# Patient Record
Sex: MALE | Race: WHITE
[De-identification: names, ages, dates, MRNs, and addresses within clinical notes are randomized per-mention and may not be internally consistent; named-entity substitution may affect disease eponyms.]

---

## 2018-07-03 NOTE — DIAGNOSTIC IMAGING REPORT
PROCEDURE: US abdomen complete.



TECHNIQUE: Multiple real-time grayscale images were obtained over

the abdomen in various projections.



INDICATION: Epigastric pain.



FINDINGS: The liver is normal in size at 15.6 cm. No discrete

liver mass is identified. The portal vein is patent and

demonstrates normal direction of flow. Gallbladder contains small

polyps. No stones or sludge are identified. There is no wall

thickening or biliary ductal dilatation. Pancreas is poorly

visualized due to bowel gas. Spleen is normal in size. Aorta and

IVC are unremarkable. The right and left kidneys are

unremarkable. There is no ascites.



IMPRESSION: Essentially unremarkable abdominal ultrasound. No

acute feature is detected.



Dictated by: 



  Dictated on workstation # [id number]

## 2021-06-05 ENCOUNTER — HOSPITAL ENCOUNTER (INPATIENT)
Dept: HOSPITAL 75 - ER | Age: 51
LOS: 1 days | Discharge: HOME | DRG: 310 | End: 2021-06-06
Attending: FAMILY MEDICINE | Admitting: FAMILY MEDICINE
Payer: COMMERCIAL

## 2021-06-05 VITALS — HEIGHT: 68.9 IN | WEIGHT: 209.88 LBS | BODY MASS INDEX: 31.09 KG/M2

## 2021-06-05 VITALS — DIASTOLIC BLOOD PRESSURE: 92 MMHG | SYSTOLIC BLOOD PRESSURE: 126 MMHG

## 2021-06-05 DIAGNOSIS — I48.92: ICD-10-CM

## 2021-06-05 DIAGNOSIS — F17.220: ICD-10-CM

## 2021-06-05 DIAGNOSIS — I48.91: Primary | ICD-10-CM

## 2021-06-05 LAB
ALBUMIN SERPL-MCNC: 4.4 GM/DL (ref 3.2–4.5)
ALP SERPL-CCNC: 76 U/L (ref 40–136)
ALT SERPL-CCNC: 30 U/L (ref 0–55)
ANISOCYTOSIS BLD QL SMEAR: SLIGHT
APTT BLD: 29 SEC (ref 24–35)
BASOPHILS # BLD AUTO: 0 10^3/UL (ref 0–0.1)
BASOPHILS NFR BLD AUTO: 0 % (ref 0–10)
BILIRUB SERPL-MCNC: 0.5 MG/DL (ref 0.1–1)
BUN/CREAT SERPL: 15
BURR CELLS BLD QL SMEAR: SLIGHT
CALCIUM SERPL-MCNC: 9.2 MG/DL (ref 8.5–10.1)
CHLORIDE SERPL-SCNC: 108 MMOL/L (ref 98–107)
CO2 SERPL-SCNC: 24 MMOL/L (ref 21–32)
CREAT SERPL-MCNC: 1.02 MG/DL (ref 0.6–1.3)
D DIMER PPP FEU-MCNC: 0.06 UG/ML (ref 0–0.49)
DACRYOCYTES BLD QL SMEAR: SLIGHT
EOSINOPHIL # BLD AUTO: 0.1 10^3/UL (ref 0–0.3)
EOSINOPHIL NFR BLD AUTO: 2 % (ref 0–10)
EOSINOPHIL NFR BLD MANUAL: 7 %
GFR SERPLBLD BASED ON 1.73 SQ M-ARVRAT: > 60 ML/MIN
GLUCOSE SERPL-MCNC: 88 MG/DL (ref 70–105)
HCT VFR BLD CALC: 43 % (ref 40–54)
HGB BLD-MCNC: 14.4 G/DL (ref 13.3–17.7)
INR PPP: 1 (ref 0.8–1.4)
LYMPHOCYTES # BLD AUTO: 2.8 10^3/UL (ref 1–4)
LYMPHOCYTES NFR BLD AUTO: 49 % (ref 12–44)
MAGNESIUM SERPL-MCNC: 2 MG/DL (ref 1.6–2.4)
MANUAL DIFFERENTIAL PERFORMED BLD QL: YES
MCH RBC QN AUTO: 29 PG (ref 25–34)
MCHC RBC AUTO-ENTMCNC: 34 G/DL (ref 32–36)
MCV RBC AUTO: 87 FL (ref 80–99)
MICROCYTES BLD QL SMEAR: SLIGHT
MONOCYTES # BLD AUTO: 0.7 10^3/UL (ref 0–1)
MONOCYTES NFR BLD AUTO: 12 % (ref 0–12)
MONOCYTES NFR BLD: 10 %
NEUTROPHILS # BLD AUTO: 2.1 10^3/UL (ref 1.8–7.8)
NEUTROPHILS NFR BLD AUTO: 37 % (ref 42–75)
NEUTS BAND NFR BLD MANUAL: 31 %
NEUTS BAND NFR BLD: 1 %
PLATELET # BLD: 225 10^3/UL (ref 130–400)
PMV BLD AUTO: 10.2 FL (ref 9–12.2)
POIKILOCYTOSIS BLD QL SMEAR: SLIGHT
POTASSIUM SERPL-SCNC: 4.2 MMOL/L (ref 3.6–5)
PROT SERPL-MCNC: 7.6 GM/DL (ref 6.4–8.2)
PROTHROMBIN TIME: 13.2 SEC (ref 12.2–14.7)
SMUDGE CELLS # BLD: SLIGHT 10*3/UL
SODIUM SERPL-SCNC: 143 MMOL/L (ref 135–145)
VARIANT LYMPHS NFR BLD MANUAL: 2 %
VARIANT LYMPHS NFR BLD MANUAL: 49 %
WBC # BLD AUTO: 5.6 10^3/UL (ref 4.3–11)

## 2021-06-05 PROCEDURE — 93005 ELECTROCARDIOGRAM TRACING: CPT

## 2021-06-05 PROCEDURE — 93306 TTE W/DOPPLER COMPLETE: CPT

## 2021-06-05 PROCEDURE — 85379 FIBRIN DEGRADATION QUANT: CPT

## 2021-06-05 PROCEDURE — 87081 CULTURE SCREEN ONLY: CPT

## 2021-06-05 PROCEDURE — 84100 ASSAY OF PHOSPHORUS: CPT

## 2021-06-05 PROCEDURE — 85730 THROMBOPLASTIN TIME PARTIAL: CPT

## 2021-06-05 PROCEDURE — 85610 PROTHROMBIN TIME: CPT

## 2021-06-05 PROCEDURE — 83880 ASSAY OF NATRIURETIC PEPTIDE: CPT

## 2021-06-05 PROCEDURE — 71045 X-RAY EXAM CHEST 1 VIEW: CPT

## 2021-06-05 PROCEDURE — 83735 ASSAY OF MAGNESIUM: CPT

## 2021-06-05 PROCEDURE — 93041 RHYTHM ECG TRACING: CPT

## 2021-06-05 PROCEDURE — 85025 COMPLETE CBC W/AUTO DIFF WBC: CPT

## 2021-06-05 PROCEDURE — 85027 COMPLETE CBC AUTOMATED: CPT

## 2021-06-05 PROCEDURE — 36415 COLL VENOUS BLD VENIPUNCTURE: CPT

## 2021-06-05 PROCEDURE — 80061 LIPID PANEL: CPT

## 2021-06-05 PROCEDURE — 85007 BL SMEAR W/DIFF WBC COUNT: CPT

## 2021-06-05 PROCEDURE — 80048 BASIC METABOLIC PNL TOTAL CA: CPT

## 2021-06-05 PROCEDURE — 80053 COMPREHEN METABOLIC PANEL: CPT

## 2021-06-05 PROCEDURE — 83874 ASSAY OF MYOGLOBIN: CPT

## 2021-06-05 PROCEDURE — 84484 ASSAY OF TROPONIN QUANT: CPT

## 2021-06-05 RX ADMIN — SODIUM CHLORIDE, SODIUM LACTATE, POTASSIUM CHLORIDE, AND CALCIUM CHLORIDE SCH MLS/HR: 600; 310; 30; 20 INJECTION, SOLUTION INTRAVENOUS at 21:08

## 2021-06-05 NOTE — ED CARDIAC GENERAL
History of Present Illness


General


Chief Complaint:  Cardiac/General Problems


Stated Complaint:  HIGH HEART RATE


Source:  patient


Exam Limitations:  no limitations





History of Present Illness


Date Seen by Provider:  2021


Time Seen by Provider:  19:26


Initial Comments


To ER by private vehicle with reports of high heart rate and palpitations since 

noon today.  He had some chest pain at the onset of this in the upper chest that

radiated into his neck.  He denies shortness of breath.  He does have a 

sensation of lightheadedness.  Has a history of these palpitations intermittent

ly but states that they normally go away on their own.  He does not have any 

personal history of arrhythmia or coronary disease.  He bicycles 10 to 20 miles 

a day and considers himself pretty healthy.  Alcohol use is only occasional and 

none today.  This episode of palpitations and tachycardia began today while he 

was at a golf tournament.


Timing/Duration:  changing over time


Severity:  moderate


Activities at Onset:  none


Prior CP/Workup:  no prior chest pain


NTG SL PTA:  No


ASA po PTA:  No





Allergies and Home Medications


Allergies


Coded Allergies:  


     No Known Drug Allergies (Unverified , 21)





Patient Home Medication List


Home Medication List Reviewed:  Yes





Review of Systems


Review of Systems


Constitutional:  see HPI


EENTM:  No Symptoms Reported


Respiratory:  No Symptoms Reported


Cardiovascular:  See HPI, Chest Pain, Irregular Heart Rate, Palpitations


Gastrointestinal:  See HPI, Abdominal Pain


Genitourinary:  No Symptoms Reported


Musculoskeletal:  no symptoms reported


Skin:  no symptoms reported


Psychiatric/Neurological:  No Symptoms Reported


Endocrine:  No Symptoms Reported


Hematologic/Lymphatic:  No Symptoms Reported





Physical Exam


Vital Signs





Vital Signs - First Documented








 21





 19:26


 


Temp 36.6


 


Pulse 116


 


Resp 18


 


B/P (MAP) 132/112 (119)





Capillary Refill :


Height, Weight, BMI


Height: '"


Weight: lbs. oz. kg;  BMI


Method:


General Appearance:  No Apparent Distress, WD/WN


Neck:  Full Range of Motion, Normal Inspection


Respiratory:  No Accessory Muscle Use, No Respiratory Distress


Cardiovascular:  Normal Peripheral Pulses, Irregularly Irregular, Tachycardia


Gastrointestinal:  Normal Bowel Sounds, Non Tender, Soft


Extremity:  Normal Capillary Refill, Normal Inspection


Neurologic/Psychiatric:  Alert, Oriented x3


Skin:  Normal Color, Warm/Dry





Progress/Results/Core Measures


Results/Orders


Lab Results





Laboratory Tests








Test


 21


19:21 Range/Units


 


 


White Blood Count


 5.6 


 4.3-11.0


10^3/uL


 


Red Blood Count


 4.93 


 4.30-5.52


10^6/uL


 


Hemoglobin 14.4  13.3-17.7  g/dL


 


Hematocrit 43  40-54  %


 


Mean Corpuscular Volume 87  80-99  fL


 


Mean Corpuscular Hemoglobin 29  25-34  pg


 


Mean Corpuscular Hemoglobin


Concent 34 


 32-36  g/dL





 


Red Cell Distribution Width 12.7  10.0-14.5  %


 


Platelet Count


 225 


 130-400


10^3/uL


 


Mean Platelet Volume 10.2  9.0-12.2  fL


 


Immature Granulocyte % (Auto) 0   %


 


Neutrophils (%) (Auto) 37 L 42-75  %


 


Lymphocytes (%) (Auto) 49 H 12-44  %


 


Monocytes (%) (Auto) 12  0-12  %


 


Eosinophils (%) (Auto) 2  0-10  %


 


Basophils (%) (Auto) 0  0-10  %


 


Neutrophils # (Auto)


 2.1 


 1.8-7.8


10^3/uL


 


Lymphocytes # (Auto)


 2.8 


 1.0-4.0


10^3/uL


 


Monocytes # (Auto)


 0.7 


 0.0-1.0


10^3/uL


 


Eosinophils # (Auto)


 0.1 


 0.0-0.3


10^3/uL


 


Basophils # (Auto)


 0.0 


 0.0-0.1


10^3/uL


 


Immature Granulocyte # (Auto)


 0.0 


 0.0-0.1


10^3/uL


 


Neutrophils % (Manual) 31   %


 


Lymphocytes % (Manual) 49   %


 


Monocytes % (Manual) 10   %


 


Eosinophils % (Manual) 7   %


 


Band Neutrophils 1   %


 


Atypical Lymphocytes 2   %


 


Smudge Cells SLIGHT   


 


Poikilocytosis SLIGHT   


 


Anisocytosis SLIGHT   


 


Microcytosis SLIGHT   


 


Tear Drop Cells SLIGHT   


 


Duc Cells SLIGHT   


 


Prothrombin Time 13.2  12.2-14.7  SEC


 


INR Comment 1.0  0.8-1.4  


 


Activated Partial


Thromboplast Time 29 


 24-35  SEC





 


D-Dimer


 0.06 


 0.00-0.49


UG/ML


 


Sodium Level 143  135-145  MMOL/L


 


Potassium Level 4.2  3.6-5.0  MMOL/L


 


Chloride Level 108 H   MMOL/L


 


Carbon Dioxide Level 24  21-32  MMOL/L


 


Anion Gap 11  5-14  MMOL/L


 


Blood Urea Nitrogen 15  7-18  MG/DL


 


Creatinine


 1.02 


 0.60-1.30


MG/DL


 


Estimat Glomerular Filtration


Rate > 60 


  





 


BUN/Creatinine Ratio 15   


 


Glucose Level 88    MG/DL


 


Calcium Level 9.2  8.5-10.1  MG/DL


 


Corrected Calcium 8.9  8.5-10.1  MG/DL


 


Magnesium Level 2.0  1.6-2.4  MG/DL


 


Total Bilirubin 0.5  0.1-1.0  MG/DL


 


Aspartate Amino Transf


(AST/SGOT) 29 


 5-34  U/L





 


Alanine Aminotransferase


(ALT/SGPT) 30 


 0-55  U/L





 


Alkaline Phosphatase 76    U/L


 


Myoglobin


 75.5 


 10.0-92.0


NG/ML


 


Troponin I < 0.028  <0.028  NG/ML


 


B-Type Natriuretic Peptide 51.7  <100.0  PG/ML


 


Total Protein 7.6  6.4-8.2  GM/DL


 


Albumin 4.4  3.2-4.5  GM/DL








My Orders





Orders - OLIVIA GREER APRN


Cbc With Automated Diff (21 19:24)


Magnesium (21 19:24)


Chest 1 View, Ap/Pa Only (21 19:24)


Ekg Tracing (21 19:24)


Comprehensive Metabolic Panel (21 19:24)


Myoglobin Serum (21 19:24)


Protime With Inr (21 19:24)


Partial Thromboplastin Time (21 19:24)


O2 (21 19:24)


Monitor-Rhythm Ecg Trace Only (21 19:24)


Lipid Panel (21 06:00)


Ed Iv/Invasive Line Start (21 19:24)


BNP (21 19:24)


Troponin I (21 19:24)


Aspirin Chewable Tablet (Baby Aspirin Ch (21 19:30)


Ns Iv 1000 Ml (Sodium Chloride 0.9%) (21 19:30)


Diltiazem Injection (Cardizem Injection) (21 19:30)


Diltiazem Drip Pre-Mix (Cardizem Drip Pr (21 19:30)


Manual Differential (6/5/21 19:21)


Fibrin Degradation Products (21 19:21)


Apixaban Tablet (Eliquis Tablet) (21 20:30)





Medications Given in ED





Current Medications








 Medications  Dose


 Ordered  Sig/Jacki


 Route  Start Time


 Stop Time Status Last Admin


Dose Admin


 


 Aspirin  324 mg  ONCE  ONCE


 PO  21 19:30


 21 19:31 DC 21 19:33


324 MG


 


 Diltiazem HCl  10 mg  ONCE  ONCE


 IVP  21 19:30


 21 19:31 DC 21 19:33


10 MG








Vital Signs/I&O











 21





 19:26


 


Temp 36.6


 


Pulse 116


 


Resp 18


 


B/P (MAP) 132/112 (119)











Diagnostic Imaging





   Diagonstic Imaging:  Xray


Comments


NAME:   ADDISON RAMIREZ 


MED REC#:   [id number]


ACCOUNT#:   [id number]


PT STATUS:   REG ER


:   1970


PHYSICIAN:   OLIVIA GREER APRTRISTIN


ADMIT DATE:   21/ER


                                   ***Draft***


Date of Exam:21





CHEST 1 VIEW, AP/PA ONLY








EXAMINATION: Chest 1 view.





HISTORY: Chest pain.





COMPARISON: None available.





FINDINGS: The lungs are clear without edema or pneumonia. No


pleural effusion or pneumothorax. Heart size is normal.





IMPRESSION: Clear lungs.





  Dictated on workstation # [id number]








Dict:   21


Trans:   21


E [id number]





Interpreted by:     MARY HILL MD


Electronically signed by:





Departure


Communication (Admissions)


[id number] rate of 125 borderline prolonged QT interval of 485ms. 


-the Cardizem drip is at 10 mg an hour his heart rate is in the 70s still 

atrial flutter blood pressure 130/100.  Eliquis 5 mg orally ordered.  Spoke with

Dr. Kwan will continue this regimen in the ICU, spoke with Dr. Rico she agrees 

to admit.





Impression





   Primary Impression:  


   New onset a-fib


Disposition:   ADMITTED AS INPATIENT


Condition:  Stable





Admissions


Decision to Admit Reason:  Admit from ER (General)


Decision to Admit/Date:  2021


Time/Decision to Admit Time:  19:30





Departure-Patient Inst.


Referrals:  


MARGY MAYORGA DO (PCP/Family)


Primary Care Physician











OLIVIA GREER APRN              2021 19:32

## 2021-06-05 NOTE — DIAGNOSTIC IMAGING REPORT
EXAMINATION: Chest 1 view.



HISTORY: Chest pain.



COMPARISON: None available.



FINDINGS: The lungs are clear without edema or pneumonia. No

pleural effusion or pneumothorax. Heart size is normal.



IMPRESSION: Clear lungs.



Dictated by: 



  Dictated on workstation # [id number]

## 2021-06-06 LAB
BASOPHILS # BLD AUTO: 0 10^3/UL (ref 0–0.1)
BASOPHILS NFR BLD AUTO: 0 % (ref 0–10)
BUN/CREAT SERPL: 16
CALCIUM SERPL-MCNC: 8.9 MG/DL (ref 8.5–10.1)
CHLORIDE SERPL-SCNC: 108 MMOL/L (ref 98–107)
CHOLEST SERPL-MCNC: 207 MG/DL (ref ?–200)
CO2 SERPL-SCNC: 21 MMOL/L (ref 21–32)
CREAT SERPL-MCNC: 0.82 MG/DL (ref 0.6–1.3)
EOSINOPHIL # BLD AUTO: 0.1 10^3/UL (ref 0–0.3)
EOSINOPHIL NFR BLD AUTO: 2 % (ref 0–10)
GFR SERPLBLD BASED ON 1.73 SQ M-ARVRAT: > 60 ML/MIN
GLUCOSE SERPL-MCNC: 108 MG/DL (ref 70–105)
HCT VFR BLD CALC: 42 % (ref 40–54)
HDLC SERPL-MCNC: 53 MG/DL (ref 40–60)
HGB BLD-MCNC: 14.5 G/DL (ref 13.3–17.7)
LYMPHOCYTES # BLD AUTO: 3 10^3/UL (ref 1–4)
LYMPHOCYTES NFR BLD AUTO: 57 % (ref 12–44)
MAGNESIUM SERPL-MCNC: 2 MG/DL (ref 1.6–2.4)
MANUAL DIFFERENTIAL PERFORMED BLD QL: NO
MCH RBC QN AUTO: 30 PG (ref 25–34)
MCHC RBC AUTO-ENTMCNC: 35 G/DL (ref 32–36)
MCV RBC AUTO: 86 FL (ref 80–99)
MONOCYTES # BLD AUTO: 0.5 10^3/UL (ref 0–1)
MONOCYTES NFR BLD AUTO: 9 % (ref 0–12)
NEUTROPHILS # BLD AUTO: 1.6 10^3/UL (ref 1.8–7.8)
NEUTROPHILS NFR BLD AUTO: 31 % (ref 42–75)
PHOSPHATE SERPL-MCNC: 2.6 MG/DL (ref 2.3–4.7)
PLATELET # BLD: 226 10^3/UL (ref 130–400)
PMV BLD AUTO: 10.6 FL (ref 9–12.2)
POTASSIUM SERPL-SCNC: 4 MMOL/L (ref 3.6–5)
SODIUM SERPL-SCNC: 143 MMOL/L (ref 135–145)
TRIGL SERPL-MCNC: 57 MG/DL (ref ?–150)
VLDLC SERPL CALC-MCNC: 11 MG/DL (ref 5–40)
WBC # BLD AUTO: 5.3 10^3/UL (ref 4.3–11)

## 2021-06-06 RX ADMIN — SODIUM CHLORIDE, SODIUM LACTATE, POTASSIUM CHLORIDE, AND CALCIUM CHLORIDE SCH MLS/HR: 600; 310; 30; 20 INJECTION, SOLUTION INTRAVENOUS at 07:08

## 2021-06-06 NOTE — HISTORY & PHYSICAL-HOSPITALIST
History of Present Illness


HPI/Chief Complaint


Pt is a 49yoCM with no known medical problems who presented to the ER due to 

heart palpitations. He was out playing golf yesterday and started to feel his 

heart racing. He became someone faint and decided to go home and rest and 

rehydrate. He thought his symptoms were just due to dehydration from the heat. 

After resting and drinking water his heart rate remained in the 130-135 range 

when he normally runs around 50 as he is an active bicyclist. He decided to seek

evaluation in the ER where he was found to be in atrial flutter. He reports that

he has never been diagnosed with this but has had similar symptoms for years 

that had resolved on their own. He converted to sinus overnight and now feels 

well.


Source:  patient


Date Seen


21


Time Seen by a Provider:  09:42


Attending Physician


Iva Rico MD


PCP


J Carlos Carrero DO


Referring Physician





Date of Admission


2021 at 19:33





Home Medications & Allergies


Home Medications


Reviewed patient Home Medication Reconciliation performed by pharmacy medication

reconciliations technician and/or nursing.


Patients Allergies have been reviewed.





Allergies





Allergies


Coded Allergies


  No Known Drug Allergies (Unverified21)








Past Medical-Social-Family Hx


Patient Social History


Marrital Status:  


Employed/Student:  employed


Tobacco Use?:  Yes


Smoking Status:  Never a Smoker


Smokeless type used:  Chew, Pouch


Smokeless Tobacco Frequency:  Current Everyday User


Use of E-Cig and/or Vaping dev:  No


Substance use?:  No


Substance frequency:  Rarely


Alcohol Use?:  Yes


Alcohol Frequency:  Rarely


Pt feels they are or have been:  No





Immunizations Up To Date


First/Initial COVID19 Vaccinat:  3/2021


Second COVID19 Vaccination Quentin:  2021


PED Vaccines UTD:  Yes





Seasonal Allergies


Seasonal Allergies:  No





Current Status


Advance Directives:  No


Communicates:  Verbally


Primary Language:  English


Preferred Spoken Language:  English


Is interpretation needed?:  No


Implanted or Applied Medical D:  None





Past Medical History


Headaches /Migraines


Blood Disorders:  No





Family Medical History


Reviewed Nursing Family Hx


No Pertinent Family Hx





Review of Systems


Constitutional:  No chills, No fever


EENTM:  no symptoms reported


Respiratory:  No cough, No dyspnea on exertion, No short of breath


Cardiovascular:  No chest pain, No edema, No Hx of Intervention; palpitations; 

No syncope


Gastrointestinal:  No abdominal pain, No constipation, No diarrhea, No nausea, 

No vomiting


Genitourinary:  No dysuria, No frequency


Musculoskeletal:  no symptoms reported


Skin:  no symptoms reported


Psychiatric/Neurological:  No Symptoms Reported





Physical Exam


Physical Exam


Vital Signs





Vital Signs - First Documented








 21





 19:26 20:45


 


Temp 36.6 


 


Pulse 116 


 


Resp 18 


 


B/P (MAP) 132/112 (119) 


 


Pulse Ox  98


 


O2 Delivery  Room Air





Capillary Refill : Less Than 3 Seconds


Height, Weight, BMI


Height: '"


Weight: lbs. oz. kg; 31.08 BMI


Method:


General Appearance:  No Apparent Distress, WD/WN


HEENT:  PERRL/EOMI, Moist Mucous Membranes; No Scleral Icterus (L), No Scleral 

Icterus (R)


Neck:  Normal Inspection, Supple


Respiratory:  Lungs Clear, No Accessory Muscle Use, No Respiratory Distress


Cardiovascular:  Regular Rate, Rhythm, No JVD, No Murmur


Gastrointestinal:  Normal Bowel Sounds, Soft


Extremity:  Normal Capillary Refill, No Calf Tenderness, No Pedal Edema


Neurologic/Psychiatric:  Alert, Oriented x3, Normal Mood/Affect


Skin:  Normal Color, Warm/Dry





Results


Results/Procedures


Labs


Laboratory Tests


21 19:21








21 03:04








Patient resulted labs reviewed.


Imaging:  Reviewed Imaging Report


Imaging


                 [facility] [place], [place]





NAME:   JAMESADDISON EREN [facility] REC#:   [id number]


ACCOUNT#:   [id number]


PT STATUS:   ADM IN


:   1970


PHYSICIAN:   OLIVIA GREER


ADMIT DATE:   21/ICU


                                  ***Signed***


Date of Exam:21





CHEST 1 VIEW, AP/PA ONLY








EXAMINATION: Chest 1 view.





HISTORY: Chest pain.





COMPARISON: None available.





FINDINGS: The lungs are clear without edema or pneumonia. No


pleural effusion or pneumothorax. Heart size is normal.





IMPRESSION: Clear lungs.





Dictated by: 





  Dictated on workstation # [id number]








Dict:   21


Trans:   21


[facility] [id number]





Interpreted by:     MARY HILL MD


Electronically signed by: MARY HILL MD 21





Assessment/Plan


Admission Diagnosis


New onset atrial flutter


Admission Status:  Observation





Assessment and Plan


New onset atrial flutter


   Converted to sinus overnight


   Echo ordered


   Continue Eliquis for now but CHADSVASC score of 0 so may only need aspirin, 

will defer to cardiology


   Cardiology consulted, appreciate recs


   Telemetry





Chew tobacco use


   Encourage cessation





DVT ppx: On eliquis





Diagnosis/Problems


Diagnosis/Problems





(1) New onset a-fib


Status:  Acute





Clinical Quality Measures


AMI/AHF:


ASA po Prior to arrival:  No





Copy


Copies To 1:   J CARLOS CARRERO KATELYN M MD               2021 09:47

## 2021-06-06 NOTE — CONSULTATION-CARDIOLOGY
HPI-Cardiology


Cardiology Consultation:


Date of Consultation


6/6/21


Time Seen by a Provider:  13:50


Date of Admission


6/5/21


Attending Physician


Iva Rico MD


Admitting Physician


J Carlos Carrero DO


Consulting Physician


JEYSON MIN MD, MA, FACP, FACC, FSCAI, CCDS





HPI:


Chief Complaint:


CC: Palpitations


HPI


49 yo man who developed palpitations while playing golf yesterday, came to ER, 

found to be in A Fl with RVR, treated with iv Cardizem, converted overnight to 

sinus. Feels well now. Notes presence of similar palpitations for several years,

generally lasting only a few min, persistent yesterday. No cp or syncope. No 

shortness of breath or swelling. Physically active





Review of Systems-Cardiology


Review of Systems


Constitutional:  No malaise, No tiredness, No weight loss, No weight gain


Eyes:  No vision change


Ears/Nose/Throat:  No ear discharge, No nasal drainage, No recent hearing loss


Respiratory:  As described under HPI


Cardiovascular:  As described under HPI


Gastrointestinal:  No constipation, No diarrhea, No nausea, No other


Genitourinary:  No dysuria, No hematuria, No urine frequency changes


Musculoskeletal:  No back pain, No joint pain


Skin:  No rash, No ulcerations


Psychiatric/Neurological:  No seizure, No focal weakness, No syncope


Hematologic:  No bleeding abnormalities





PMH-Social-Family Hx


Patient Social History


Marrital Status:  


Employed/Student:  employed


Smoking Status:  Never a Smoker


2nd Hand Smoke Exposure:  No


Have you traveled recently?:  No


Alcohol Use?:  Yes


Pt feels they are or have been:  No





Immunizations Up To Date


Tetanus Booster (TDap):  More than 5yrs





Past Medical History


PMH


As described under Assessment.





Family Medical History


Family Medical History:  


Father had MI at age ^*





Allergies and Home Medications


Allergies


Coded Allergies:  


     No Known Drug Allergies (Unverified , 6/5/21)





Patient Home Medication List


Home Medication List Reviewed:  Yes





Physical Exam-Cardiology


Physical Exam


Vital Signs/I&O











 6/6/21 6/6/21 6/6/21 6/6/21





 03:00 03:26 03:52 04:00


 


Temp   37.0 


 


Pulse 45   46


 


Resp 15   23


 


B/P (MAP) 108/79 (89)   103/73 (83)


 


Pulse Ox 99   97


 


O2 Delivery Room Air Room Air  Room Air


 


    





 6/6/21 6/6/21 6/6/21 6/6/21





 05:00 06:00 07:00 07:00


 


Pulse 47 47 56 54


 


Resp 16 16 14 


 


B/P (MAP) 100/64 (76) 95/64 (74) 106/74 (85) 


 


Pulse Ox 98 99 98 


 


O2 Delivery Room Air Room Air Room Air 





 6/6/21 6/6/21 6/6/21 6/6/21





 08:00 08:00 08:30 09:00


 


Temp 36.6   


 


Pulse  49  54


 


Resp  13  12


 


B/P (MAP)  113/78 (90)  109/79 (89)


 


Pulse Ox  98 98 99


 


O2 Delivery  Room Air Room Air Room Air


 


    





 6/6/21 6/6/21 6/6/21 6/6/21





 10:00 11:00 11:11 11:52


 


Temp    36.8


 


Pulse 53 57  


 


Resp 13 19  


 


B/P (MAP) 118/76 (90) 119/82 (94)  


 


Pulse Ox 98 98 99 


 


O2 Delivery Room Air Room Air Room Air 


 


    





 6/6/21 6/6/21 6/6/21 





 12:00 12:39 13:00 


 


Pulse 54 53 58 


 


Resp 14  22 


 


B/P (MAP) 109/67 (81)  103/74 (84) 


 


Pulse Ox 97  97 


 


O2 Delivery Room Air  Room Air 














 6/6/21





 00:00


 


Intake Total 1000 ml


 


Output Total 200 ml


 


Balance 800 ml





Capillary Refill : Less Than 3 Seconds


Constitutional:  AAO x 3, well-developed, well-nourished


HEENT:  EOMI, hearing is well preserved; No xanthelasmas are seen


Neck:  carotid pulses are 2 + bilaterally, with good upstrokes


Respiratory:  No accessory muscle use; other (good, bilateral air entry)


Cardiovascular:  regular rate-rhythm, S1 and S2


Gastrointestinal:  No tender; soft; No guarding, No rebound; audible bowel 

sounds


Extremities:  No clubbing, No cyanosis, No significant edema


Neurologic/Psychiatric:  oriented x 3, other (moves all limbs equally)


Skin:  No rash on exposed areas, No ulcerations on exposed areas





Data Review


Labs


Laboratory Tests


6/5/21 19:21: 


White Blood Count 5.6, Red Blood Count 4.93, Hemoglobin 14.4, Hematocrit 43, 

Mean Corpuscular Volume 87, Mean Corpuscular Hemoglobin 29, Mean Corpuscular 

Hemoglobin Concent 34, Red Cell Distribution Width 12.7, Platelet Count 225, 

Mean Platelet Volume 10.2, Immature Granulocyte % (Auto) 0, Neutrophils (%) 

(Auto) 37L, Lymphocytes (%) (Auto) 49H, Monocytes (%) (Auto) 12, Eosinophils (%)

(Auto) 2, Basophils (%) (Auto) 0, Neutrophils # (Auto) 2.1, Lymphocytes # (Auto)

2.8, Monocytes # (Auto) 0.7, Eosinophils # (Auto) 0.1, Basophils # (Auto) 0.0, 

Immature Granulocyte # (Auto) 0.0, Neutrophils % (Manual) 31, Lymphocytes % 

(Manual) 49, Monocytes % (Manual) 10, Eosinophils % (Manual) 7, Band Neutrophils

1, Atypical Lymphocytes 2, Smudge Cells SLIGHT, Poikilocytosis SLIGHT, 

Anisocytosis SLIGHT, Microcytosis SLIGHT, Tear Drop Cells SLIGHT, [place] Cells 

SLIGHT, Prothrombin Time 13.2, INR Comment 1.0, Activated Partial Thromboplast 

Time 29, D-Dimer 0.06, Sodium Level 143, Potassium Level 4.2, Chloride Level 

108H, Carbon Dioxide Level 24, Anion Gap 11, Blood Urea Nitrogen 15, Creatinine 

1.02, Estimat Glomerular Filtration Rate > 60, BUN/Creatinine Ratio 15, Glucose 

Level 88, Calcium Level 9.2, Corrected Calcium 8.9, Magnesium Level 2.0, Total 

Bilirubin 0.5, Aspartate Amino Transf (AST/SGOT) 29, Alanine Aminotransferase 

(ALT/SGPT) 30, Alkaline Phosphatase 76, Myoglobin 75.5, Troponin I < 0.028, B-

Type Natriuretic Peptide 51.7, Total Protein 7.6, Albumin 4.4


6/6/21 00:30: Troponin I < 0.028


6/6/21 03:04: 


White Blood Count 5.3, Red Blood Count 4.85, Hemoglobin 14.5, Hematocrit 42, 

Mean Corpuscular Volume 86, Mean Corpuscular Hemoglobin 30, Mean Corpuscular 

Hemoglobin Concent 35, Red Cell Distribution Width 12.7, Platelet Count 226, 

Mean Platelet Volume 10.6, Immature Granulocyte % (Auto) 0, Neutrophils (%) 

(Auto) 31L, Lymphocytes (%) (Auto) 57H, Monocytes (%) (Auto) 9, Eosinophils (%) 

(Auto) 2, Basophils (%) (Auto) 0, Neutrophils # (Auto) 1.6L, Lymphocytes # 

(Auto) 3.0, Monocytes # (Auto) 0.5, Eosinophils # (Auto) 0.1, Basophils # (Auto)

0.0, Immature Granulocyte # (Auto) 0.0, Sodium Level 143, Potassium Level 4.0, 

Chloride Level 108H, Carbon Dioxide Level 21, Anion Gap 14, Blood Urea Nitrogen 

13, Creatinine 0.82, Estimat Glomerular Filtration Rate > 60, BUN/Creatinine 

Ratio 16, Glucose Level 108H, Calcium Level 8.9, Magnesium Level 2.0, Phosphorus

Level 2.6, Triglycerides Level 57, Cholesterol Level 207H, LDL Cholesterol 

Direct 158H, VLDL Cholesterol 11, HDL Cholesterol 53





Laboratory Tests


6/5/21 19:21








6/6/21 03:04











A/P-Cardiology


Assessment/Admission Diagnosis





Typical atrial flutter with RVR, currently NSR





Discussion and Recomendations





* We had a long and detailed discussion


* Treatment options reviewed. He desires conservative therapy


* Low-dose, long-acting dilt for rate control


* Apixaban for stroke prophylaxis


* Potential side effects of meds discussed


* Outpt f/u recommended





Clinical Quality Measures


AMI/AHF:


ASA po Prior to arrival:  [facility] Becker [facility]    Jun 6, 2021 15:01